# Patient Record
Sex: FEMALE | Race: WHITE | HISPANIC OR LATINO
[De-identification: names, ages, dates, MRNs, and addresses within clinical notes are randomized per-mention and may not be internally consistent; named-entity substitution may affect disease eponyms.]

---

## 2024-11-05 ENCOUNTER — DASHBOARD ENCOUNTERS (OUTPATIENT)
Age: 64
End: 2024-11-05

## 2024-11-05 ENCOUNTER — LAB OUTSIDE AN ENCOUNTER (OUTPATIENT)
Dept: URBAN - METROPOLITAN AREA CLINIC 40 | Facility: CLINIC | Age: 64
End: 2024-11-05

## 2024-11-05 ENCOUNTER — OFFICE VISIT (OUTPATIENT)
Dept: URBAN - METROPOLITAN AREA CLINIC 40 | Facility: CLINIC | Age: 64
End: 2024-11-05
Payer: COMMERCIAL

## 2024-11-05 VITALS
DIASTOLIC BLOOD PRESSURE: 74 MMHG | HEIGHT: 60 IN | HEART RATE: 91 BPM | WEIGHT: 154 LBS | TEMPERATURE: 97.9 F | SYSTOLIC BLOOD PRESSURE: 110 MMHG | BODY MASS INDEX: 30.23 KG/M2

## 2024-11-05 DIAGNOSIS — R12 HEARTBURN: ICD-10-CM

## 2024-11-05 DIAGNOSIS — Z87.19 HISTORY OF IBS: ICD-10-CM

## 2024-11-05 DIAGNOSIS — R10.13 EPIGASTRIC ABDOMINAL PAIN: ICD-10-CM

## 2024-11-05 DIAGNOSIS — Z86.19 HISTORY OF HELICOBACTER INFECTION: ICD-10-CM

## 2024-11-05 DIAGNOSIS — Z90.49 HISTORY OF CHOLECYSTECTOMY: ICD-10-CM

## 2024-11-05 PROCEDURE — 99203 OFFICE O/P NEW LOW 30 MIN: CPT | Performed by: PHYSICIAN ASSISTANT

## 2024-11-05 RX ORDER — PANTOPRAZOLE SODIUM 40 MG/1
1 TABLET TABLET, DELAYED RELEASE ORAL ONCE A DAY
Qty: 30 TABLET | Refills: 1 | OUTPATIENT
Start: 2024-11-05

## 2024-11-05 RX ORDER — DICYCLOMINE HYDROCHLORIDE 10 MG/1
2 CAPSULES CAPSULE ORAL THREE TIMES A DAY
Status: ON HOLD | COMMUNITY

## 2024-11-05 RX ORDER — LEVOTHYROXINE SODIUM 50 UG/1
1 TABLET IN THE MORNING ON AN EMPTY STOMACH TABLET ORAL ONCE A DAY
Status: ACTIVE | COMMUNITY

## 2024-11-05 RX ORDER — SUCRALFATE 1 G/1
1 TABLET ON AN EMPTY STOMACH TABLET ORAL TWICE A DAY
Qty: 60 TABLET | Refills: 1 | OUTPATIENT
Start: 2024-11-05 | End: 2025-01-04

## 2024-11-05 NOTE — PHYSICAL EXAM CONSTITUTIONAL:
HOME CARE INSTRUCTIONS      DIET: To avoid nausea, slowly advance diet as tolerated, avoiding spicy or greasy foods for the first day.  Add more substantial food to your diet according to your physician's instructions.  Babies can be fed formula or breast milk as soon as they are hungry.  INCREASE FLUIDS AND FIBER TO AVOID CONSTIPATION.    MEDICATIONS: Resume taking daily medication.  Take prescribed pain medication with food.  If no medication is prescribed, you may take non-aspirin pain medication if needed.  PAIN MEDICATION CAN BE VERY CONSTIPATING.  Take a stool softener or laxative such as senokot, pericolace, or milk of magnesia if needed.    A follow-up appointment should be arranged with your doctor; call to schedule.    You should call 911 if you develop problems with breathing or chest pain.  If you are unable to contact your doctor or surgical center, you should go to the nearest emergency room or urgent care center.  Physician's telephone #: Dr. Esteban [phone].    If any questions arise, call your doctor.  If your doctor is not available, please feel free to call the Surgical Center at [phone].  The Center is open Monday through Friday from 7AM to 7PM.      You may also receive a survey in the mail within the next two weeks and we ask that you take a few moments to complete the survey and return it to us.  Our goal is to provide you with very good care and we value your comments.     Depression / Suicide Risk    As you are discharged from this [facility] facility, it is important to learn how to keep safe from harming yourself.    Recognize the warning signs:  Abrupt changes in personality, positive or negative- including increase in energy   Giving away possessions  Change in eating patterns- significant weight changes-  positive or negative  Change in sleeping patterns- unable to sleep or sleeping all the time   Unwillingness or inability to communicate  Depression  Unusual  well developed, well nourished , in no acute distress , ambulating without difficulty , normal communication ability sadness, discouragement and loneliness  Talk of wanting to die  Neglect of personal appearance   Rebelliousness- reckless behavior  Withdrawal from people/activities they love  Confusion- inability to concentrate     If you or a loved one observes any of these behaviors or has concerns about self-harm, here's what you can do:  Talk about it- your feelings and reasons for harming yourself  Remove any means that you might use to hurt yourself (examples: pills, rope, extension cords, firearm)  Get professional help from the community (Mental Health, Substance Abuse, psychological counseling)  Do not be alone:Call your Safe Contact- someone whom you trust who will be there for you.  Call your local CRISIS HOTLINE 070-7657 or [phone]  Call your local Children's Mobile Crisis Response Team [place] (038) 372-0667 or www.[employer]  Call the toll free National Suicide Prevention Hotlines   National Suicide Prevention Lifeline 529-591-YKAG (7035)  National Hope Line Network 800-SUICIDE (695-0565)    I acknowledge receipt and understanding of these Home Care instructions.

## 2024-11-05 NOTE — PHYSICAL EXAM GASTROINTESTINAL
Abdomen , soft, mild TTP to left flank, nondistended , no guarding or rigidity , no masses palpable , normal bowel sounds , Liver and Spleen,  no hepatosplenomegaly , liver nontender

## 2024-11-05 NOTE — HPI-TODAY'S VISIT:
Ms. Bernard Ramos is a 64-year-old  and primarily Lao-speaking female who presents to the office today with complaint of abdominal pain with heartburn and reflux, nausea with no vomiting. No dysphagia.  Patient has been seen by primary medical physician who referred her to our office.  She has been on pantoprazole and famotidine.  Patient also has a history of chronic pain and has been given dicyclomine in the past. She maintains that she has been taking her pantoprazole but did not take the dicyclomine and she was concerned with possible side effects with long-term use and effect on the bones.  Due to arthritic pain, she will take she is having left upper quadrant pain and occasional epigastric pain currently, worse in the last 2 to 3 months, and states that it does not matter what she eats.  Tylenol or generic acetaminophen as needed.  She does not eat many fatty foods or fried foods, does not eat many sweets and avoids alcohol, tobacco and NSAIDs.  She hydrates well.  Denies any strenuous activity or trauma to the affected side.  She has been doing some aquatic aerobics with history of knee surgery.  She admits to 2 to 3 years ago when she was having some pain she did have an EGD and was treated for H. pylori gastritis.  However, after the first round of treatment she was positive with breath test and was treated once again.  Reportedly, earlier this year and H. pylori breath test returned negative, confirming eradication.  Colonoscopy normal 2 to 3 years ago while in [facility] [place].  No family history given of colorectal cancer.  Patient does admit some stress as she is living alone.  Otherwise, no complaints.  Good appetite.  No change in bowel habits, rectal bleeding or melena.  History of cholecystectomy remotely.  CT abdomen and pelvis with contrast November 1, 2024 was unremarkable with no active inflammation, no evidence of pancreatitis.

## 2024-12-02 ENCOUNTER — ERX REFILL RESPONSE (OUTPATIENT)
Dept: URBAN - METROPOLITAN AREA CLINIC 40 | Facility: CLINIC | Age: 64
End: 2024-12-02

## 2024-12-02 ENCOUNTER — OFFICE VISIT (OUTPATIENT)
Dept: URBAN - METROPOLITAN AREA SURGERY CENTER 30 | Facility: SURGERY CENTER | Age: 64
End: 2024-12-02
Payer: COMMERCIAL

## 2024-12-02 ENCOUNTER — CLAIMS CREATED FROM THE CLAIM WINDOW (OUTPATIENT)
Dept: URBAN - METROPOLITAN AREA CLINIC 4 | Facility: CLINIC | Age: 64
End: 2024-12-02
Payer: COMMERCIAL

## 2024-12-02 DIAGNOSIS — K29.70 GASTRITIS, UNSPECIFIED, WITHOUT BLEEDING: ICD-10-CM

## 2024-12-02 DIAGNOSIS — K22.89 OTHER SPECIFIED DISEASE OF ESOPHAGUS: ICD-10-CM

## 2024-12-02 DIAGNOSIS — K31.89 OTHER DISEASES OF STOMACH AND DUODENUM: ICD-10-CM

## 2024-12-02 DIAGNOSIS — K29.60 ADENOPAPILLOMATOSIS GASTRICA: ICD-10-CM

## 2024-12-02 PROCEDURE — 43239 EGD BIOPSY SINGLE/MULTIPLE: CPT | Performed by: INTERNAL MEDICINE

## 2024-12-02 PROCEDURE — 00731 ANES UPR GI NDSC PX NOS: CPT | Performed by: NURSE ANESTHETIST, CERTIFIED REGISTERED

## 2024-12-02 PROCEDURE — 88342 IMHCHEM/IMCYTCHM 1ST ANTB: CPT | Performed by: STUDENT IN AN ORGANIZED HEALTH CARE EDUCATION/TRAINING PROGRAM

## 2024-12-02 PROCEDURE — 88305 TISSUE EXAM BY PATHOLOGIST: CPT | Performed by: STUDENT IN AN ORGANIZED HEALTH CARE EDUCATION/TRAINING PROGRAM

## 2024-12-02 RX ORDER — LEVOTHYROXINE SODIUM 50 UG/1
1 TABLET IN THE MORNING ON AN EMPTY STOMACH TABLET ORAL ONCE A DAY
Status: ACTIVE | COMMUNITY

## 2024-12-02 RX ORDER — DICYCLOMINE HYDROCHLORIDE 10 MG/1
2 CAPSULES CAPSULE ORAL THREE TIMES A DAY
Status: ON HOLD | COMMUNITY

## 2024-12-02 RX ORDER — PANTOPRAZOLE SODIUM 40 MG/1
1 TABLET TABLET, DELAYED RELEASE ORAL ONCE A DAY
Qty: 30 TABLET | Refills: 1 | Status: ACTIVE | COMMUNITY
Start: 2024-11-05

## 2024-12-02 RX ORDER — SUCRALFATE 1 G/1
1 TABLET ON AN EMPTY STOMACH TABLET ORAL TWICE A DAY
Qty: 60 TABLET | Refills: 1 | OUTPATIENT

## 2024-12-02 RX ORDER — SUCRALFATE 1 G/1
1 TABLET ON AN EMPTY STOMACH TABLET ORAL TWICE A DAY
Qty: 60 TABLET | Refills: 1 | Status: ACTIVE | COMMUNITY
Start: 2024-11-05 | End: 2025-01-04

## 2024-12-02 RX ORDER — SUCRALFATE 1 G/1
TOME 1 TABLETA POR VIA ORAL DOS VECES AL DIA EN ESTOMAGO VACIO FOR 30 DAYS TABLET ORAL
Qty: 180 TABLET | Refills: 1 | OUTPATIENT

## 2025-01-02 ENCOUNTER — OFFICE VISIT (OUTPATIENT)
Dept: URBAN - METROPOLITAN AREA CLINIC 40 | Facility: CLINIC | Age: 65
End: 2025-01-02
Payer: MEDICARE

## 2025-01-02 VITALS
WEIGHT: 155 LBS | DIASTOLIC BLOOD PRESSURE: 62 MMHG | TEMPERATURE: 97.7 F | HEIGHT: 60 IN | BODY MASS INDEX: 30.43 KG/M2 | SYSTOLIC BLOOD PRESSURE: 114 MMHG | HEART RATE: 82 BPM

## 2025-01-02 DIAGNOSIS — K29.70 GASTRITIS: ICD-10-CM

## 2025-01-02 DIAGNOSIS — K44.9 HIATAL HERNIA: ICD-10-CM

## 2025-01-02 DIAGNOSIS — R10.13 EPIGASTRIC ABDOMINAL PAIN: ICD-10-CM

## 2025-01-02 PROCEDURE — 99213 OFFICE O/P EST LOW 20 MIN: CPT | Performed by: PHYSICIAN ASSISTANT

## 2025-01-02 RX ORDER — PANTOPRAZOLE SODIUM 40 MG/1
1 TABLET TABLET, DELAYED RELEASE ORAL ONCE A DAY
Qty: 90 TABLET | Refills: 0
Start: 2024-11-05

## 2025-01-02 RX ORDER — DICYCLOMINE HYDROCHLORIDE 10 MG/1
1 TABLET CAPSULE ORAL THREE TIMES A DAY
Qty: 90 TABLET | Refills: 1

## 2025-01-02 RX ORDER — SUCRALFATE 1 G/1
TOME 1 TABLETA POR VIA ORAL DOS VECES AL DIA EN ESTOMAGO VACIO FOR 30 DAYS TABLET ORAL
Qty: 180 TABLET | Refills: 1 | Status: ACTIVE | COMMUNITY

## 2025-01-02 RX ORDER — PANTOPRAZOLE SODIUM 40 MG/1
1 TABLET TABLET, DELAYED RELEASE ORAL ONCE A DAY
Qty: 30 TABLET | Refills: 1 | Status: ON HOLD | COMMUNITY
Start: 2024-11-05

## 2025-01-02 RX ORDER — LEVOTHYROXINE SODIUM 50 UG/1
1 TABLET IN THE MORNING ON AN EMPTY STOMACH TABLET ORAL ONCE A DAY
Status: ACTIVE | COMMUNITY

## 2025-01-02 RX ORDER — SUCRALFATE 1 G/1
1 TABLET ON AN EMPTY STOMACH ORALLY TWICE A DAY TABLET ORAL
Qty: 180 TABLET | Refills: 0

## 2025-01-02 RX ORDER — DICYCLOMINE HYDROCHLORIDE 10 MG/1
2 CAPSULES CAPSULE ORAL THREE TIMES A DAY
Status: ACTIVE | COMMUNITY

## 2025-01-02 NOTE — HPI-TODAY'S VISIT:
Ms. Bernard Ramos is a 64-year-old  and primarily Hebrew-speaking female who was seen 11/5/24, with complaint of abdominal pain with heartburn and reflux, nausea with no vomiting. No dysphagia. She has been on pantoprazole and famotidine.  Patient also has a history of chronic pain and has been given dicyclomine in the past. She maintains that she has been taking her pantoprazole but did not take the dicyclomine and she was concerned with possible side effects with long-term use and effect on the bones.  Due to arthritic pain, she will take she is having left upper quadrant pain and occasional epigastric pain currently, worse in the last 2 to 3 months, and states that it does not matter what she eats.  Tylenol or generic acetaminophen as needed.  She does not eat many fatty foods or fried foods, does not eat many sweets and avoids alcohol, tobacco and NSAIDs.  She hydrates well.  Denies any strenuous activity or trauma to the affected side.  She has been doing some aquatic aerobics with history of knee surgery.  She admits to 2 to 3 years ago when she was having some pain she did have an EGD and was treated for H. pylori gastritis.  However, after the first round of treatment she was positive with breath test and was treated once again.  Reportedly, earlier this year and H. pylori breath test returned negative, confirming eradication.  Colonoscopy normal 6 years ago while in [place], [place].  No family history given of colorectal cancer.  Patient does admit some stress as she is living alone.  Otherwise, no complaints.  Good appetite.  No change in bowel habits, rectal bleeding or melena.  History of cholecystectomy remotely.  CT abdomen and pelvis with contrast November 1, 2024 was unremarkable with no active inflammation, no evidence of pancreatitis.  EGD December 2, 2024, small hiatal hernia and gastritis.  Abnormal lesions of the duodenum.  Gastric biopsies with chronic gastritis, no H. pylori.  No dysplasia.  Esophageal biopsies unremarkable as well as biopsies from the small bowel. The patient has been doing very well since her procedure and believes that 90% of her issues have improved with both dicyclomine and sucralfate.  However, she has been taking the pantoprazole for some reason and has been taking Tums as needed.  She continues to monitor her diet.  No new complaints.

## 2025-01-29 ENCOUNTER — ERX REFILL RESPONSE (OUTPATIENT)
Dept: URBAN - METROPOLITAN AREA CLINIC 40 | Facility: CLINIC | Age: 65
End: 2025-01-29

## 2025-01-29 RX ORDER — DICYCLOMINE HYDROCHLORIDE 10 MG/1
TOME 1 CAPSULA POR VIA ORAL TRES VECES AL DIA CUANDO SEA NECESARIO FOR 30 DAYS CAPSULE ORAL
Qty: 270 CAPSULE | Refills: 1 | OUTPATIENT

## 2025-01-29 RX ORDER — DICYCLOMINE HYDROCHLORIDE 10 MG/1
1 TABLET CAPSULE ORAL THREE TIMES A DAY
Qty: 90 TABLET | Refills: 1 | OUTPATIENT

## 2025-04-02 ENCOUNTER — OFFICE VISIT (OUTPATIENT)
Dept: URBAN - METROPOLITAN AREA CLINIC 40 | Facility: CLINIC | Age: 65
End: 2025-04-02
Payer: COMMERCIAL

## 2025-04-02 DIAGNOSIS — R10.13 EPIGASTRIC ABDOMINAL PAIN: ICD-10-CM

## 2025-04-02 DIAGNOSIS — K29.70 GASTRITIS: ICD-10-CM

## 2025-04-02 DIAGNOSIS — K44.9 HIATAL HERNIA: ICD-10-CM

## 2025-04-02 PROCEDURE — 99213 OFFICE O/P EST LOW 20 MIN: CPT | Performed by: PHYSICIAN ASSISTANT

## 2025-04-02 RX ORDER — PANTOPRAZOLE SODIUM 40 MG/1
1 TABLET TABLET, DELAYED RELEASE ORAL ONCE A DAY
Qty: 90 TABLET | Refills: 0 | Status: ACTIVE | COMMUNITY
Start: 2024-11-05

## 2025-04-02 RX ORDER — SUCRALFATE 1 G/1
1 TABLET ON AN EMPTY STOMACH ORALLY TWICE A DAY TABLET ORAL
Qty: 180 TABLET | Refills: 0 | Status: ACTIVE | COMMUNITY

## 2025-04-02 RX ORDER — LEVOTHYROXINE SODIUM 50 UG/1
1 TABLET IN THE MORNING ON AN EMPTY STOMACH TABLET ORAL ONCE A DAY
Status: ACTIVE | COMMUNITY

## 2025-04-02 RX ORDER — DICYCLOMINE HYDROCHLORIDE 10 MG/1
TOME 1 CAPSULA POR VIA ORAL TRES VECES AL DIA CUANDO SEA NECESARIO FOR 30 DAYS CAPSULE ORAL
Qty: 270 CAPSULE | Refills: 1 | Status: ACTIVE | COMMUNITY

## 2025-04-02 RX ORDER — PANTOPRAZOLE SODIUM 40 MG/1
1 TABLET TABLET, DELAYED RELEASE ORAL ONCE A DAY
Qty: 90 TABLET | Refills: 1
Start: 2025-04-02

## 2025-04-02 NOTE — HPI-TODAY'S VISIT:
Ms. Bernard Ramos is a 64-year-old  and primarily Israeli-speaking female who was seen  1/2/25, for f/u of abdominal pain with heartburn and reflux, nausea with no vomiting. No dysphagia. She has been on pantoprazole and famotidine.  Patient also has a history of chronic pain and has been given dicyclomine in the past. She maintains that she has been taking her pantoprazole but did not take the dicyclomine and she was concerned with possible side effects with long-term use and effect on the bones.   She does not eat many fatty foods or fried foods, does not eat many sweets and avoids alcohol, tobacco and NSAIDs.  She hydrates well.  Denies any strenuous activity or trauma to the affected side.  She has been doing some aquatic aerobics with history of knee surgery.  She admits to 2 to 3 years ago when she was having some pain she did have an EGD and was treated for H. pylori gastritis.  However, after the first round of treatment she was positive with breath test and was treated once again.  Reportedly, earlier this year and H. pylori breath test returned negative, confirming eradication.   Colonoscopy normal 7 years ago while in [place], [place].  No family history given of colorectal cancer.  History of cholecystectomy remotely.  CT abdomen and pelvis with contrast November 1, 2024 was unremarkable with no active inflammation, no evidence of pancreatitis.  EGD December 2, 2024, small hiatal hernia and gastritis.  Abnormal lesions of the duodenum.  Gastric biopsies with chronic gastritis, no H. pylori.  No dysplasia.  Esophageal biopsies unremarkable as well as biopsies from the small bowel.  Overall patient is feeling better in regards to heartburn and reflux but still has occasional breakthrough symptoms, often missing pantoprazole as she is worried the dose is too close to her levothyroxine.  No new complaints of chest pain, nausea vomiting or dysphagia.  She is on anti-inflammatories following her left knee replacement surgery a month ago and recent manipulation.  Denies any change in bowel habits, rectal bleeding or melena.  She is closely monitoring her diet.

## 2025-05-02 ENCOUNTER — TELEPHONE ENCOUNTER (OUTPATIENT)
Dept: URBAN - METROPOLITAN AREA CLINIC 40 | Facility: CLINIC | Age: 65
End: 2025-05-02

## 2025-05-02 ENCOUNTER — P2P PATIENT RECORD (OUTPATIENT)
Age: 65
End: 2025-05-02

## 2025-05-07 ENCOUNTER — LAB OUTSIDE AN ENCOUNTER (OUTPATIENT)
Dept: URBAN - METROPOLITAN AREA CLINIC 40 | Facility: CLINIC | Age: 65
End: 2025-05-07

## 2025-05-07 ENCOUNTER — OFFICE VISIT (OUTPATIENT)
Dept: URBAN - METROPOLITAN AREA CLINIC 40 | Facility: CLINIC | Age: 65
End: 2025-05-07
Payer: COMMERCIAL

## 2025-05-07 ENCOUNTER — TELEPHONE ENCOUNTER (OUTPATIENT)
Dept: URBAN - METROPOLITAN AREA CLINIC 40 | Facility: CLINIC | Age: 65
End: 2025-05-07

## 2025-05-07 DIAGNOSIS — R10.13 EPIGASTRIC ABDOMINAL PAIN: ICD-10-CM

## 2025-05-07 DIAGNOSIS — R79.89 ELEVATED LFTS: ICD-10-CM

## 2025-05-07 PROCEDURE — 99214 OFFICE O/P EST MOD 30 MIN: CPT | Performed by: INTERNAL MEDICINE

## 2025-05-07 RX ORDER — DICYCLOMINE HYDROCHLORIDE 10 MG/1
TOME 1 CAPSULA POR VIA ORAL TRES VECES AL DIA CUANDO SEA NECESARIO FOR 30 DAYS CAPSULE ORAL
Qty: 270 CAPSULE | Refills: 1 | Status: ACTIVE | COMMUNITY

## 2025-05-07 RX ORDER — LEVOTHYROXINE SODIUM 50 UG/1
1 TABLET IN THE MORNING ON AN EMPTY STOMACH TABLET ORAL ONCE A DAY
Status: ACTIVE | COMMUNITY

## 2025-05-07 RX ORDER — SUCRALFATE 1 G/1
1 TABLET ON AN EMPTY STOMACH ORALLY TWICE A DAY TABLET ORAL
Qty: 180 TABLET | Refills: 0 | Status: ACTIVE | COMMUNITY

## 2025-05-07 RX ORDER — PANTOPRAZOLE SODIUM 40 MG/1
1 TABLET TABLET, DELAYED RELEASE ORAL ONCE A DAY
Qty: 90 TABLET | Refills: 1 | Status: ON HOLD | COMMUNITY
Start: 2025-04-02

## 2025-05-07 NOTE — HPI-TODAY'S VISIT:
Ms. Bernard Ramos presents to clinic for follow-up.  She was last seen by the physician assistant in April.Patient has a history of gastritis and a hiatal hernia noted on EGD in December of last year.She went to the emergency room on May 2.Records were reviewed via epic.  She had a CT scan and lab work that were unremarkable except for urachal cyst.Patienthad not been taking her pantoprazole.  She has had concerns regarding taking that along with her levothyroxine.She was advised to use dicyclomine andsulcrafate.  She states she is taken the sulcrafate 4 in the morningthen taking her levothyroxineand then taking further doses of the sulcrafate.  She admits prior to this flare of abdominal pain that she was takingnaproxen for her knees.  She states she has been given oxycodone and acetaminophen for this as well as injections.She also shows me labs thatindicate a mild elevation in her LFTs with an AST of 40 and an ALT of 50.  Alkaline phosphatase was 132.

## 2025-05-07 NOTE — PHYSICAL EXAM GASTROINTESTINAL
Abdomen , soft,  mild JUSTINE tenderness, nondistended , no guarding or rigidity , no masses palpable , normal bowel sounds , Liver and Spleen , no hepatomegaly present , no hepatosplenomegaly , liver nontender , spleen not palpable

## 2025-06-18 ENCOUNTER — OFFICE VISIT (OUTPATIENT)
Dept: URBAN - METROPOLITAN AREA CLINIC 40 | Facility: CLINIC | Age: 65
End: 2025-06-18
Payer: COMMERCIAL

## 2025-06-18 DIAGNOSIS — R10.11 RUQ ABDOMINAL PAIN: ICD-10-CM

## 2025-06-18 DIAGNOSIS — K76.0 FATTY INFILTRATION OF LIVER: ICD-10-CM

## 2025-06-18 DIAGNOSIS — K44.9 HIATAL HERNIA: ICD-10-CM

## 2025-06-18 PROCEDURE — 99214 OFFICE O/P EST MOD 30 MIN: CPT | Performed by: INTERNAL MEDICINE

## 2025-06-18 RX ORDER — DICYCLOMINE HYDROCHLORIDE 10 MG/1
TOME 1 CAPSULA POR VIA ORAL TRES VECES AL DIA CUANDO SEA NECESARIO FOR 30 DAYS CAPSULE ORAL
Qty: 270 CAPSULE | Refills: 1 | Status: ACTIVE | COMMUNITY

## 2025-06-18 RX ORDER — DICYCLOMINE HYDROCHLORIDE 20 MG/1
1 TABLET TABLET ORAL
Qty: 120 TABLET | Refills: 0 | OUTPATIENT
Start: 2025-06-18

## 2025-06-18 RX ORDER — LEVOTHYROXINE SODIUM 50 UG/1
1 TABLET IN THE MORNING ON AN EMPTY STOMACH TABLET ORAL ONCE A DAY
Status: ACTIVE | COMMUNITY

## 2025-06-18 RX ORDER — DICYCLOMINE HYDROCHLORIDE 10 MG/1
TOME 1 CAPSULA POR VIA ORAL TRES VECES AL DIA CUANDO SEA NECESARIO FOR 30 DAYS CAPSULE ORAL
OUTPATIENT

## 2025-06-18 RX ORDER — PANTOPRAZOLE SODIUM 40 MG/1
1 TABLET TABLET, DELAYED RELEASE ORAL ONCE A DAY
Qty: 90 TABLET | Refills: 1 | Status: ACTIVE | COMMUNITY
Start: 2025-04-02

## 2025-06-18 RX ORDER — SUCRALFATE 1 G/1
1 TABLET ON AN EMPTY STOMACH ORALLY TWICE A DAY TABLET ORAL
Qty: 180 TABLET | Refills: 0 | Status: ACTIVE | COMMUNITY

## 2025-06-18 NOTE — PHYSICAL EXAM CONSTITUTIONAL:
Overweight HF well developed, well nourished , in no acute distress , ambulating without difficulty , normal communication ability

## 2025-06-18 NOTE — HPI-TODAY'S VISIT:
Ms. Bernard Ramos presents to clinic for follow-up. Viit facilitated by translator Montero via videoconference.   She was last seen on May 7.  We have been working her up for epigastric pain.  Workup is included an EGD in December of last year with a small hiatal hernia and gastritis she is currently taking PPI and Carafate as needed.  She stopped NSAIDs as instructed.  She got a CT scan in May that showed her urachal cyst but otherwise was unremarkable.  She had a right upper quadrant ultrasound that showed fatty liver and the fact that she is status post a cholecystectomy.  She still having abdominal discomfort occasionally with meals.  It is more central to the right and into her back.  They can last a few minutes to an hour.  She denies any nausea.  She has had no heartburn as long as she takes the PPI.  She is moving her bowels daily.  No blood in the stool or melena.

## 2025-06-18 NOTE — PHYSICAL EXAM GASTROINTESTINAL
Abdomen , soft,  mild RUQ tenderness,  nondistended , no guarding or rigidity , no masses palpable , normal bowel sounds , Liver and Spleen , no hepatomegaly present , no hepatosplenomegaly , liver nontender , spleen not palpable

## 2025-08-04 ENCOUNTER — OFFICE VISIT (OUTPATIENT)
Dept: URBAN - METROPOLITAN AREA CLINIC 40 | Facility: CLINIC | Age: 65
End: 2025-08-04

## 2025-08-04 RX ORDER — LEVOTHYROXINE SODIUM 50 UG/1
1 TABLET IN THE MORNING ON AN EMPTY STOMACH TABLET ORAL ONCE A DAY
Status: ACTIVE | COMMUNITY

## 2025-08-04 RX ORDER — DICYCLOMINE HYDROCHLORIDE 20 MG/1
1 TABLET TABLET ORAL
Qty: 120 TABLET | Refills: 0 | Status: ACTIVE | COMMUNITY
Start: 2025-06-18

## 2025-08-04 RX ORDER — SUCRALFATE 1 G/1
1 TABLET ON AN EMPTY STOMACH ORALLY TWICE A DAY TABLET ORAL
Qty: 180 TABLET | Refills: 0 | Status: ACTIVE | COMMUNITY

## 2025-08-04 RX ORDER — PANTOPRAZOLE SODIUM 40 MG/1
1 TABLET TABLET, DELAYED RELEASE ORAL ONCE A DAY
Qty: 90 TABLET | Refills: 1 | Status: ACTIVE | COMMUNITY
Start: 2025-04-02

## 2025-08-05 ENCOUNTER — OFFICE VISIT (OUTPATIENT)
Dept: URBAN - METROPOLITAN AREA CLINIC 40 | Facility: CLINIC | Age: 65
End: 2025-08-05
Payer: COMMERCIAL

## 2025-08-05 DIAGNOSIS — K44.9 HIATAL HERNIA: ICD-10-CM

## 2025-08-05 DIAGNOSIS — R10.11 RUQ ABDOMINAL PAIN: ICD-10-CM

## 2025-08-05 DIAGNOSIS — K76.0 FATTY INFILTRATION OF LIVER: ICD-10-CM

## 2025-08-05 DIAGNOSIS — K76.89 LIVER CYSTS: ICD-10-CM

## 2025-08-05 DIAGNOSIS — R10.13 EPIGASTRIC ABDOMINAL PAIN: ICD-10-CM

## 2025-08-05 PROCEDURE — 99213 OFFICE O/P EST LOW 20 MIN: CPT | Performed by: PHYSICIAN ASSISTANT

## 2025-08-05 RX ORDER — PANTOPRAZOLE SODIUM 40 MG/1
1 TABLET TABLET, DELAYED RELEASE ORAL ONCE A DAY
Qty: 90 TABLET | Refills: 1 | Status: ACTIVE | COMMUNITY
Start: 2025-04-02

## 2025-08-05 RX ORDER — LEVOTHYROXINE SODIUM 50 UG/1
1 TABLET IN THE MORNING ON AN EMPTY STOMACH TABLET ORAL ONCE A DAY
Status: ACTIVE | COMMUNITY

## 2025-08-05 RX ORDER — DICYCLOMINE HYDROCHLORIDE 20 MG/1
1 TABLET TABLET ORAL
Qty: 120 TABLET | Refills: 0 | Status: ACTIVE | COMMUNITY
Start: 2025-06-18

## 2025-08-05 RX ORDER — SUCRALFATE 1 G/1
1 TABLET ON AN EMPTY STOMACH ORALLY TWICE A DAY TABLET ORAL
Qty: 180 TABLET | Refills: 0 | Status: ON HOLD | COMMUNITY

## 2025-08-05 RX ORDER — PANTOPRAZOLE SODIUM 40 MG/1
1 TABLET TABLET, DELAYED RELEASE ORAL ONCE A DAY
Qty: 90 TABLET | Refills: 1
Start: 2025-04-02

## 2025-08-05 RX ORDER — DICYCLOMINE HYDROCHLORIDE 20 MG/1
1 TABLET TABLET ORAL
Qty: 120 TABLET | Refills: 1
Start: 2025-06-18

## 2025-08-28 ENCOUNTER — ERX REFILL RESPONSE (OUTPATIENT)
Dept: URBAN - METROPOLITAN AREA CLINIC 40 | Facility: CLINIC | Age: 65
End: 2025-08-28

## 2025-08-28 RX ORDER — DICYCLOMINE HYDROCHLORIDE 20 MG/1
1 TABLET ORALLY FOUR TIMES A DAY WHEN NEEDED TABLET ORAL
Qty: 360 TABLET | Refills: 1 | OUTPATIENT

## 2025-08-28 RX ORDER — DICYCLOMINE HYDROCHLORIDE 20 MG/1
1 TABLET TABLET ORAL
Qty: 120 TABLET | Refills: 1 | OUTPATIENT